# Patient Record
Sex: MALE | Race: WHITE
[De-identification: names, ages, dates, MRNs, and addresses within clinical notes are randomized per-mention and may not be internally consistent; named-entity substitution may affect disease eponyms.]

---

## 2017-09-11 ENCOUNTER — TRANSCRIPTION ENCOUNTER (OUTPATIENT)
Age: 22
End: 2017-09-11

## 2017-09-11 PROBLEM — Z00.00 ENCOUNTER FOR PREVENTIVE HEALTH EXAMINATION: Status: ACTIVE | Noted: 2017-09-11

## 2017-09-12 ENCOUNTER — APPOINTMENT (OUTPATIENT)
Dept: ULTRASOUND IMAGING | Facility: CLINIC | Age: 22
End: 2017-09-12
Payer: COMMERCIAL

## 2017-09-12 ENCOUNTER — OUTPATIENT (OUTPATIENT)
Dept: OUTPATIENT SERVICES | Facility: HOSPITAL | Age: 22
LOS: 1 days | End: 2017-09-12
Payer: COMMERCIAL

## 2017-09-12 DIAGNOSIS — Z00.8 ENCOUNTER FOR OTHER GENERAL EXAMINATION: ICD-10-CM

## 2017-09-12 DIAGNOSIS — K82.9 DISEASE OF GALLBLADDER, UNSPECIFIED: ICD-10-CM

## 2017-09-12 PROCEDURE — 76700 US EXAM ABDOM COMPLETE: CPT

## 2017-09-12 PROCEDURE — 76700 US EXAM ABDOM COMPLETE: CPT | Mod: 26

## 2019-05-14 ENCOUNTER — APPOINTMENT (OUTPATIENT)
Dept: ULTRASOUND IMAGING | Facility: CLINIC | Age: 24
End: 2019-05-14
Payer: COMMERCIAL

## 2019-05-14 ENCOUNTER — OUTPATIENT (OUTPATIENT)
Dept: OUTPATIENT SERVICES | Facility: HOSPITAL | Age: 24
LOS: 1 days | End: 2019-05-14
Payer: COMMERCIAL

## 2019-05-14 DIAGNOSIS — N50.9 DISORDER OF MALE GENITAL ORGANS, UNSPECIFIED: ICD-10-CM

## 2019-05-14 PROCEDURE — 76870 US EXAM SCROTUM: CPT

## 2019-05-14 PROCEDURE — 76870 US EXAM SCROTUM: CPT | Mod: 26

## 2019-09-17 ENCOUNTER — TRANSCRIPTION ENCOUNTER (OUTPATIENT)
Age: 24
End: 2019-09-17

## 2021-03-09 ENCOUNTER — TRANSCRIPTION ENCOUNTER (OUTPATIENT)
Age: 26
End: 2021-03-09

## 2021-03-15 ENCOUNTER — APPOINTMENT (OUTPATIENT)
Dept: DERMATOLOGY | Facility: CLINIC | Age: 26
End: 2021-03-15
Payer: COMMERCIAL

## 2021-03-15 ENCOUNTER — NON-APPOINTMENT (OUTPATIENT)
Age: 26
End: 2021-03-15

## 2021-03-15 DIAGNOSIS — D22.72 MELANOCYTIC NEVI OF LEFT LOWER LIMB, INCLUDING HIP: ICD-10-CM

## 2021-03-15 DIAGNOSIS — D22.5 MELANOCYTIC NEVI OF TRUNK: ICD-10-CM

## 2021-03-15 PROCEDURE — 99072 ADDL SUPL MATRL&STAF TM PHE: CPT

## 2021-03-15 PROCEDURE — 99203 OFFICE O/P NEW LOW 30 MIN: CPT

## 2021-03-15 RX ORDER — AMOXICILLIN 500 MG/1
500 CAPSULE ORAL
Qty: 21 | Refills: 0 | Status: ACTIVE | COMMUNITY
Start: 2021-03-09

## 2021-03-15 RX ORDER — OMEPRAZOLE 40 MG/1
40 CAPSULE, DELAYED RELEASE ORAL
Qty: 90 | Refills: 0 | Status: ACTIVE | COMMUNITY
Start: 2021-03-09

## 2021-03-15 RX ORDER — LORATADINE/PSEUDOEPHEDRINE 10MG-240MG
10-240 TABLET, EXTENDED RELEASE 24 HR ORAL
Qty: 20 | Refills: 0 | Status: ACTIVE | COMMUNITY
Start: 2021-03-11

## 2021-03-15 NOTE — PHYSICAL EXAM
[Full Body Skin Exam Performed] : performed [FreeTextEntry3] : Skin examination performed of the face, neck, trunk, arms, legs; \par The patient is well, alert and oriented, pleasant and cooperative.\par Eyelids, conjunctivae, oral mucosa, digits and nails all normal.  \par No cervical adenopathy.\par \par Normal findings include:\par \par Multiple benign nevi were noted. \par 15 x 11 dark brown mamillated plaque L hip; with terminal hairs\par few darker smaller nevi on lower back\par Angiomas\par Lentigines\par \par No lesions were suspicious for malignancy. \par \par

## 2021-03-15 NOTE — ASSESSMENT
[FreeTextEntry1] : Complete skin examination is negative for malignancy; Multiple new concerns were addressed and discussed.\par Therapeutic options and their risks and benefits; along with multiple diagnostic possibilities were discussed at length;\par risks and benefits of skin biopsy and/or other further study were discussed;\par \par Continue regular exams; Follow up for TBSE in 1 year \par congenital nevus;  no suspicious features;  continue to monitor

## 2021-03-15 NOTE — HISTORY OF PRESENT ILLNESS
[de-identified] : Pt. presents for skin check;\par c/o few spots of concern;  One mole present since birth; \par Severity:  mild  \par Modifying factors:  none\par Associated symptoms:  none\par Context:  no association with activity

## 2021-04-14 ENCOUNTER — TRANSCRIPTION ENCOUNTER (OUTPATIENT)
Age: 26
End: 2021-04-14

## 2021-08-17 ENCOUNTER — EMERGENCY (EMERGENCY)
Facility: HOSPITAL | Age: 26
LOS: 0 days | Discharge: HOME | End: 2021-08-17
Attending: EMERGENCY MEDICINE
Payer: COMMERCIAL

## 2021-08-17 VITALS
OXYGEN SATURATION: 96 % | DIASTOLIC BLOOD PRESSURE: 79 MMHG | HEART RATE: 106 BPM | RESPIRATION RATE: 16 BRPM | TEMPERATURE: 98 F | SYSTOLIC BLOOD PRESSURE: 131 MMHG

## 2021-08-17 VITALS — HEIGHT: 71 IN | WEIGHT: 229.94 LBS

## 2021-08-17 DIAGNOSIS — R09.89 OTHER SPECIFIED SYMPTOMS AND SIGNS INVOLVING THE CIRCULATORY AND RESPIRATORY SYSTEMS: ICD-10-CM

## 2021-08-17 DIAGNOSIS — R07.9 CHEST PAIN, UNSPECIFIED: ICD-10-CM

## 2021-08-17 DIAGNOSIS — R07.0 PAIN IN THROAT: ICD-10-CM

## 2021-08-17 DIAGNOSIS — R00.0 TACHYCARDIA, UNSPECIFIED: ICD-10-CM

## 2021-08-17 PROCEDURE — 93005 ELECTROCARDIOGRAM TRACING: CPT

## 2021-08-17 PROCEDURE — 99284 EMERGENCY DEPT VISIT MOD MDM: CPT

## 2021-08-17 PROCEDURE — 71046 X-RAY EXAM CHEST 2 VIEWS: CPT

## 2021-08-17 PROCEDURE — 71046 X-RAY EXAM CHEST 2 VIEWS: CPT | Mod: 26

## 2021-08-17 PROCEDURE — 99284 EMERGENCY DEPT VISIT MOD MDM: CPT | Mod: 25

## 2021-08-17 PROCEDURE — 93010 ELECTROCARDIOGRAM REPORT: CPT

## 2021-08-17 RX ORDER — FAMOTIDINE 10 MG/ML
20 INJECTION INTRAVENOUS ONCE
Refills: 0 | Status: COMPLETED | OUTPATIENT
Start: 2021-08-17 | End: 2021-08-17

## 2021-08-17 RX ADMIN — FAMOTIDINE 20 MILLIGRAM(S): 10 INJECTION INTRAVENOUS at 17:43

## 2021-08-17 NOTE — ED ADULT NURSE NOTE - OBJECTIVE STATEMENT
c/o chest tightness and throat discomfort when swallowing. Sx started after swallowing a certain food last night. Pt was seen by PMD today and sent to the ED for further eval. No fever or other sx. Able to tolerate secretions. No hx of similar sx before.

## 2021-08-17 NOTE — ED STATDOCS - OBJECTIVE STATEMENT
25 y/o male with no pertinent PMHx, presents to the ED c/o chest tightness and throat discomfort when swallowing. Sx started after swallowing a certain food last night. Pt was seen by PMD today and sent to the ED for further eval. No fever or other sx. Able to tolerate secretions. No hx of similar sx before.

## 2021-08-17 NOTE — ED ADULT TRIAGE NOTE - CHIEF COMPLAINT QUOTE
Reports sore throat and chest pain. EKG in progress. Pt reports recent travel to [place]. Denies any sick contacts.

## 2021-08-17 NOTE — ED STATDOCS - NSFOLLOWUPINSTRUCTIONS_ED_ALL_ED_FT
Swallowed Foreign Body, Adult       A swallowed foreign body means that you swallowed something and it got stuck. It might be food or something else. The object may get stuck in the part of your body that moves food from your mouth to your stomach (esophagus), or it may get stuck in another part of your belly (digestive tract). Often, the object will pass through your body on its own. The object may need to be taken out by a doctor if it is dangerous or if it will not pass through your body on its own.  Objects may be swallowed by accident or on purpose. It is very important to tell your doctor what you swallowed. Some swallowed objects can be very dangerous. You may need emergency treatment if:  •An object gets stuck in your throat.      •You cannot swallow.      •You cannot breathe well.      •The object is sharp.      •The object is harmful or poisonous (toxic).        What are the causes?  The most common cause is food that will not pass through the part of your body that moves food from your mouth to your stomach. When this happens, it is called food impaction. Foods that may cause this include:  •Meat.      •Hard vegetables, such as carrots and radishes.    Other common swallowed objects include:  •Pieces of bone from meat or fish.      •Toothpicks.      •Dentures.        What increases the risk?    •Wearing dentures.      •Having been drinking alcohol or taking drugs.      •Having a mental health condition.      •Having trouble with thinking and learning (cognitive impairment).      •Having a narrowed or scarred area in your belly.        What are the signs or symptoms?    •Pain or pressure in your throat or chest.      •Not being able to swallow food or liquid.      •Not being able to swallow your spit (saliva).      •Drooling.      •Choking.      •A hoarse voice.      •Trouble breathing.      •Noisy breathing.      •Vomit that has blood in it.        How is this treated?    No treatment is needed if the object is not dangerous and will come out (pass) in your poop (stool).  If the swallowed object is not dangerous, but it is stuck in the part of your body that moves food from your mouth to your stomach:  •Your doctor may gently suction out the object through your mouth.      •You may be given a medicine to relax the muscles and allow the object to pass through to the stomach.      •A procedure called endoscopy may be done to find and remove the object if it does not come out with medicine. Your doctor will put medical tools through a tube (endoscope) to remove the object.    You may need emergency treatment if:  •The object is causing you to breathe in (inhale) spit into your lungs (aspirate).      •The object is pressing on your airway. This makes it hard for you to breathe.      •The object can harm the inside of your belly. Some objects that can cause harm include batteries, magnets, sharp objects, and drugs.        Follow these instructions at home:    Caring for yourself   •If the object in your belly is expected to come out in your poop:  •Eat what you normally eat if your doctor says that you can.      •Keep checking your poop to see if the object has come out of your body.      •Call your doctor if the object has not come out of your body after 3 days.        •If you had a procedure to remove the object, follow instructions from your doctor about caring for yourself after the procedure.      General instructions     •Take over-the-counter and prescription medicines only as told by your doctor.      •Keep all follow-up visits as told by your doctor. This is important.        How is this prevented?    •Cut your food into small pieces.      •Always sit upright while eating.      •Remove bones from meat and fish.      •Chew your food well before swallowing.      • Do not talk, laugh, or walk around while eating or swallowing.        Contact a doctor if:    •You still have problems after you have been treated.      •The object has not come out of your body after 3 days.        Get help right away if:    •You have a fever.      •You have pain in your chest or your belly.      •You cough up blood.      •You have blood in your poop.      •You have blood in your vomit after treatment.        Summary    •A swallowed foreign body means that you swallowed something and it got stuck.      •The object may get stuck in the part of the body that moves food from your mouth to your stomach (esophagus), or it may get stuck in another part of your belly (digestive tract).      •Often, the object will pass through your body on its own.      •An endoscopy may be done to find and remove the object if it does not pass out of your body after you take medicine.      •Call your doctor if the object has not come out of your body after 3 days, you have blood in your poop, or you have blood when you cough or vomit.      This information is not intended to replace advice given to you by your health care provider. Make sure you discuss any questions you have with your health care provider.

## 2021-08-17 NOTE — ED STATDOCS - PROGRESS NOTE DETAILS
patient seen and evaluated.  NAD on CXR, no sign of infection, FB or esphageal rupture (patient's main concern). Tolerating PO.  Reviewed symptom management, follow up with GI if no improvement and return precautions -Wanda Oliva PA-C

## 2021-08-17 NOTE — ED STATDOCS - PATIENT PORTAL LINK FT
You can access the FollowMyHealth Patient Portal offered by [facility] by registering at the following website: [email or web address]. By joining [facility]’s FollowMyHealth portal, you will also be able to view your health information using other applications (apps) compatible with our system.

## 2021-08-17 NOTE — ED STATDOCS - CARE PROVIDER_API CALL
Ronald Cheng)  Gastroenterology; Internal Medicine  [address] [place] [address]  Phone: [phone]  Fax: [phone]  Follow Up Time:

## 2021-10-14 ENCOUNTER — TRANSCRIPTION ENCOUNTER (OUTPATIENT)
Age: 26
End: 2021-10-14

## 2021-11-18 ENCOUNTER — TRANSCRIPTION ENCOUNTER (OUTPATIENT)
Age: 26
End: 2021-11-18

## 2022-06-03 ENCOUNTER — NON-APPOINTMENT (OUTPATIENT)
Age: 27
End: 2022-06-03

## 2023-07-19 ENCOUNTER — NON-APPOINTMENT (OUTPATIENT)
Age: 28
End: 2023-07-19

## 2024-09-23 ENCOUNTER — NON-APPOINTMENT (OUTPATIENT)
Age: 29
End: 2024-09-23

## 2024-12-09 ENCOUNTER — NON-APPOINTMENT (OUTPATIENT)
Age: 29
End: 2024-12-09